# Patient Record
Sex: FEMALE | Race: WHITE | Employment: FULL TIME | URBAN - METROPOLITAN AREA
[De-identification: names, ages, dates, MRNs, and addresses within clinical notes are randomized per-mention and may not be internally consistent; named-entity substitution may affect disease eponyms.]

---

## 2017-10-27 ENCOUNTER — HOSPITAL ENCOUNTER (OUTPATIENT)
Dept: MAMMOGRAPHY | Age: 47
Discharge: HOME | End: 2017-10-27
Attending: OBSTETRICS & GYNECOLOGY | Admitting: OBSTETRICS & GYNECOLOGY

## 2017-10-27 DIAGNOSIS — Z12.31 VISIT FOR SCREENING MAMMOGRAM: ICD-10-CM

## 2018-10-29 ENCOUNTER — HOSPITAL ENCOUNTER (OUTPATIENT)
Dept: MAMMOGRAPHY | Age: 48
Discharge: HOME | End: 2018-11-03
Payer: COMMERCIAL

## 2018-10-29 DIAGNOSIS — Z12.31 VISIT FOR SCREENING MAMMOGRAM: ICD-10-CM

## 2018-10-29 PROCEDURE — 77067 SCR MAMMO BI INCL CAD: CPT

## 2019-11-15 ENCOUNTER — HOSPITAL ENCOUNTER (OUTPATIENT)
Dept: MAMMOGRAPHY | Age: 49
Discharge: HOME | End: 2019-11-20
Payer: COMMERCIAL

## 2019-11-15 DIAGNOSIS — Z12.31 VISIT FOR SCREENING MAMMOGRAM: ICD-10-CM

## 2019-11-15 PROCEDURE — 77063 BREAST TOMOSYNTHESIS BI: CPT

## 2020-10-30 ENCOUNTER — HOSPITAL ENCOUNTER (OUTPATIENT)
Dept: MAMMOGRAPHY | Age: 50
Discharge: HOME | End: 2020-11-04
Payer: COMMERCIAL

## 2020-10-30 PROCEDURE — 77063 BREAST TOMOSYNTHESIS BI: CPT

## 2020-11-17 ENCOUNTER — TELEPHONE (OUTPATIENT)
Dept: SURGERY | Age: 50
End: 2020-11-17

## 2020-11-17 NOTE — TELEPHONE ENCOUNTER
Attempted to call patient to discuss new patient paperwork, unable to reach patient. Left voicemail requesting call back to the office.

## 2020-11-18 NOTE — PROGRESS NOTES
11/19/20     CHIEF COMPLAINT:  Evaluation of high risk status and changes in right breast.     HISTORY OF PRESENT ILLNESS:  Quentin Fierro is a 53 y.o. woman referred by No ref. provider found for evaluation of her risk of breast cancer. The patient's family history is significant for breast cancer in her sister (dx 52) and breast cancer in a maternal great aunt (dx probably 79). Her sister did have BRCA gene testing which she reports was negative. She herself hasn't had any testing. No family history of ovarian cancer, prostate cancer or melanoma. She presents today to discuss her risk for breast cancer, and also some changes in her right breast. She is concerned because her sister palpated her breast mass when she was diagnosed with cancer. About 3 months ago in the shower she noticed that an area of breast tissue along her right inframammary fold felt \"different\" than it had previously. There does not seem to be a clear mass however the tissue itself just feels different, more full. There is no pain in the area. No skin changes over the area. She mentioned this when she went for her most recent screening mammogram and additional imaging was recommended given her symptoms. She notes that she has gained about 10 pounds since her hysterectomy last year, and does feel like her bra strap is now a little tighter. The patient states that she herself has not noticed any abnormal masses in her left breast.   She denies any change in the appearance of her breasts or the skin of her breasts. She denies bilateral nipple discharge. She has no other systemic complaints and otherwise feels well today. She has not had any prior breast biopsies. Her last screening mammogram was performed on 10/30/2020. No suspicious findings were seen in either breast, however due to complaints of right breast pain, additional right breast imaging is recommended before final recommendations are made.   She has never had a breast MRI. She is postmenopausal which she reports was confirmed on labs. I have reviewed her medical records. She previously had elevated liver enzymes and follows with Dr. Jorge Rodas; reports that her labs are better now that she has decreased drinking. 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 She takes no medications. PCP - John Paul, APRN-CNP [facility])  [address] [facility]     REVIEW OF SYSTEMS  Constitutional: Negative for chills, fatigue, fever and unexpected weight change. Eyes: Negative for visual disturbance. Respiratory: Negative for apnea, cough, shortness of breath and wheezing. Cardiovascular: Negative for chest pain, palpitations and leg swelling. Gastrointestinal: Negative for abdominal distention, abdominal pain, nausea and vomiting. Musculoskeletal: Negative for arthralgias, back pain, gait problem and neck pain. Skin: Negative for rash and wound. Neurological: Negative for syncope, weakness, numbness and headaches. Hematological: Negative for adenopathy. Does not bruise/bleed easily. Psychiatric/Behavioral: Negative for confusion and dysphoric mood. The patient is not nervous/anxious. I personally reviewed and agree with the above ROS as documented by my medical assistant. Obstetric and Gynecologic History  Number of pregnancies: 3  Births: 4  (twins)  Age at First Birth:  29  Age at Menstruation:  16  Still Menstruating? No, age of menopause 53  Hysterectomy? Yes age 53 for fibroids, severe bleeding - ovaries still present    Age at first mammogram: 37  Frequency of mammograms: yeaarly  Bra/Cup size: 39 C    History of breastfeeding? Yes   History of OCP Use? Yes for 15 years and is not currently taking  History of hormone use? Never. Past Medical History  History reviewed. No pertinent past medical history. Past Surgical History  History reviewed. No pertinent surgical history. Social History  Patient  reports that she has never smoked. She has never used smokeless tobacco. She reports current alcohol use. She works as a banker. Family History  Family History   Problem Relation Age of Onset    Breast Cancer Sister     Breast Cancer Maternal Aunt      Ashkenazi Quaker descent? No     Current Medications  No current outpatient medications on file. No current facility-administered medications for this visit. Allergies  Allergies not on file    PHYSICAL EXAMINATION:   VS: /81 (Site: Right Upper Arm, Position: Sitting, Cuff Size: Medium Adult)   Pulse 54   Resp 16   Ht 5' 4\" (1.626 m)   Wt 132 lb (59.9 kg)   SpO2 98%   BMI 22.66 kg/m²     General: Well-developed, well-nourished, in no apparent distress. Head: The head is normocephalic  Eyes:  Conjunctivae appear normal. Pupils are equal and reactive. Extraocular movements are intact. Neck: Supple with no thyromegaly or adenopathy  Respiratory: Normal respiratory effort, chest expands symmetrically, lungs are clear to auscultation bilaterally  Cardiovascular: Regular rate and rhythm. No lower extremity edema. Abdomen: Soft, non-distended, without obvious masses   Psychiatric: Alert and oriented x 3, appropriate affect and behavior   Musculoskeletal: Normal gait and range of motion in all 4 extremities. Skin: Warm and dry  Lymphatics: The supraclavicular, submental, cervical and axillary regions are free of significant lymphadenopathy  Right Breast: Examined with patient seated and supine. The skin, nipple, and areola appear normal, there is no skin dimpling with movement of the pectoralis, there is no nipple retraction, no nipple discharge can be elicited, the parenchyma is mildly nodular, there are no dominant masses and the axillary tail is normal. Subtle fullness along right IM fold, 6:00 and extending medially, likely compatible with dense breast tissue.    Left Breast: Examined with patient seated and supine. The skin, nipple, and areola appear normal, there is no skin dimpling with movement of the pectoralis, there is no nipple retraction, no nipple discharge can be elicited, the parenchyma is mildly nodular, there are no dominant masses and the axillary tail is normal.     --------------------------------------    IMAGING: Imaging was performed here and read by our radiologists. I personally reviewed the breast imaging performed on 10/30/2020. Mammogram revealed no suspicious findings bilaterally however imaging incomplete due to her report of right breast symptoms, BI-RADS 0. Breast density is described as average/scattered fibroglandular. I also personally reviewed the imaging from today. Targeted ultrasound was performed in the region of her palpable abnormality in the inferior outer quadrant of the right breast.  No abnormality was visualized. BI-RADS 1. PATHOLOGY:  There is no pathology to review at this time. --------------------------------------    IMPRESSION/RECOMMENDATION:      Otis Singh is a 53 y.o. woman who presents today due to changes in her right breast. There is an area of fullness at 6:00 along the IM fold. I recommend that we proceed with ultrasound to rule out any clear mass. US was able to be performed today and was negative. Additionally, due to her sister's diagnosis of breast cancer at age 52, I did perform a formal risk assessment. Based on the FANTA Risk Assessment tool (Tyrer-Cuzick Model), version 8. her lifetime risk for breast cancer is 12.9% and baed on the [address] her lifetime riskis 17.5%. Since she is <20% lifetime risk, she does not meet high risk criteria at this time. She should continue with her annual screening mammograms once a year. I encouraged her to continue her self examinations on a monthly basis and to alert me of any changes.        We did discuss general risk reduction strategies which include following general healthy life-style choices: maintenance of ideal body weight and body mass index (BMI), limiting alcohol intake (she does drink a few drinks/week), regular exercise several times a week for at least 30 minutes, and smoking cessation (she does not smoke). We also stressed the importance of breast awareness and monthly self breast examination. We also discussed the potential risk of a hereditary cancer syndrome based on her family history, and the role of genetic counseling and testing. With her sister being diagnosed at a young age, testing could potentially be covered by insurance. Her sister was tested and was negative for BRCA. At this time, she would like to hold off on any testing. She will let me know if she changes her mind, or if there are any new cancer diagnoses in the family. I answered all of her questions thoroughly, and she does seem pleased with this plan of approach. I encouraged her to contact me in the interim if any new questions or concerns arise. IN SUMMARY:  - Resume annual screening mammograms  - Follow up PRN, call with any breast concerns or if you decide you would like to meet with genetics     Jaye White. Ivis Ivy, DO  Breast Surgical Oncology    [facility]  Phone: [phone] (option 3)    She did have to leave for a meeting before her US was officially read. I called her at 1:30 PM and she did not answer. I left her a VM stating that the US was normal, and she can call if she has any questions.

## 2020-11-18 NOTE — TELEPHONE ENCOUNTER
Call placed to this patient, unable to reach patient. Left message with request to call this office back to review paperwork prior to upcoming appointment.

## 2020-11-19 ENCOUNTER — INITIAL CONSULT (OUTPATIENT)
Dept: SURGERY | Age: 50
End: 2020-11-19
Payer: COMMERCIAL

## 2020-11-19 ENCOUNTER — HOSPITAL ENCOUNTER (OUTPATIENT)
Dept: WOMENS IMAGING | Age: 50
End: 2020-11-19
Payer: COMMERCIAL

## 2020-11-19 ENCOUNTER — HOSPITAL ENCOUNTER (OUTPATIENT)
Dept: WOMENS IMAGING | Age: 50
Discharge: HOME | End: 2020-11-19
Payer: COMMERCIAL

## 2020-11-19 VITALS
SYSTOLIC BLOOD PRESSURE: 120 MMHG | RESPIRATION RATE: 16 BRPM | BODY MASS INDEX: 22.53 KG/M2 | HEIGHT: 64 IN | OXYGEN SATURATION: 98 % | HEART RATE: 54 BPM | DIASTOLIC BLOOD PRESSURE: 81 MMHG | WEIGHT: 132 LBS

## 2020-11-19 PROCEDURE — 76642 ULTRASOUND BREAST LIMITED: CPT

## 2020-11-19 PROCEDURE — 99204 OFFICE O/P NEW MOD 45 MIN: CPT | Performed by: SURGERY

## 2020-11-19 ASSESSMENT — ENCOUNTER SYMPTOMS
COUGH: 0
BACK PAIN: 0
SHORTNESS OF BREATH: 0
WHEEZING: 0
VOMITING: 0
ABDOMINAL DISTENTION: 0
NAUSEA: 0
ABDOMINAL PAIN: 0
APNEA: 0

## 2020-11-19 NOTE — LETTER
[facility]  [place] [address]  Phone: [phone]  Fax: [phone]    Raysa Mcgovern DO        November 19, 2020     Mitra Gails, APRN - CNP  [address] [id number]    Patient: Roxy Becerra  MR Number: <[id number]>  YOB: 1970  Date of Visit: 11/19/2020    Dear Ms. Madeline Groves:    I saw Roxy Becerra today for the evaluation of changes in the right breast. Below are the relevant portions of my assessment and plan of care. If you have questions, please do not hesitate to call me. I look forward to following Jodie Cushing along with you.     Sincerely,        Raysa Mcgovern DO

## 2020-11-19 NOTE — PROGRESS NOTES
Review of Systems   Constitutional: Negative for chills, fatigue, fever and unexpected weight change. Eyes: Negative for visual disturbance. Respiratory: Negative for apnea, cough, shortness of breath and wheezing. Cardiovascular: Negative for chest pain, palpitations and leg swelling. Gastrointestinal: Negative for abdominal distention, abdominal pain, nausea and vomiting. Musculoskeletal: Negative for arthralgias, back pain, gait problem and neck pain. Skin: Negative for rash and wound. Neurological: Negative for syncope, weakness, numbness and headaches. Hematological: Negative for adenopathy. Does not bruise/bleed easily. Psychiatric/Behavioral: Negative for confusion and dysphoric mood. The patient is not nervous/anxious.

## 2020-11-19 NOTE — PATIENT INSTRUCTIONS
Continue with your routine screening mammograms once a year. Continue to get 3D mammograms. I recommend that you perform self breast exams once a month. Call the office with any concerns. If you ever decide that you would like to consider genetic testing, let me know and we can refer you to genetics. Patient Education        Breast Self-Exam: Care Instructions  Your Care Instructions     A breast self-exam is when you check your breasts for lumps or changes. This regular exam helps you learn how your breasts normally look and feel. Most breast problems or changes are not because of cancer. Breast self-exam is not a substitute for a mammogram. Having regular breast exams by your doctor and regular mammograms improve your chances of finding any problems with your breasts. Some women set a time each month to do a step-by-step breast self-exam. Other women like a less formal system. They might look at their breasts as they brush their teeth, or feel their breasts once in a while in the shower. If you notice a change in your breast, tell your doctor. Follow-up care is a key part of your treatment and safety. Be sure to make and go to all appointments, and call your doctor if you are having problems. It's also a good idea to know your test results and keep a list of the medicines you take. How do you do a breast self-exam?  · The best time to examine your breasts is usually one week after your menstrual period begins. Your breasts should not be tender then. If you do not have periods, you might do your exam on a day of the month that is easy to remember. · To examine your breasts:  ? Remove all your clothes above the waist and lie down. When you are lying down, your breast tissue spreads evenly over your chest wall, which makes it easier to feel all your breast tissue. ?  Use the pads--not the fingertips--of the 3 middle fingers of your left hand to check your right breast. Move your fingers slowly in small coin-sized circles that overlap. ? Use three levels of pressure to feel of all your breast tissue. Use light pressure to feel the tissue close to the skin surface. Use medium pressure to feel a little deeper. Use firm pressure to feel your tissue close to your breastbone and ribs. Use each pressure level to feel your breast tissue before moving on to the next spot. ? Check your entire breast, moving up and down as if following a strip from the collarbone to the bra line, and from the armpit to the ribs. Repeat until you have covered the entire breast.  ? Repeat this procedure for your left breast, using the pads of the 3 middle fingers of your right hand. · To examine your breasts while in the shower:  ? Place one arm over your head and lightly soap your breast on that side. ? Using the pads of your fingers, gently move your hand over your breast (in the strip pattern described above), feeling carefully for any lumps or changes. ? Repeat for the other breast.  · Have your doctor inspect anything you notice to see if you need further testing. Where can you learn more? Go to [email or web address] org and sign in to your [employer] account. Enter P148 in the [facility] box to learn more about \"Breast Self-Exam: Care Instructions. \"     If you do not have an account, please click on the \"Sign Up Now\" link. Current as of: April 29, 2020               Content Version: 12.6  © 7141-8333 [employer]. Care instructions adapted under license by [facility] ([facility]). If you have questions about a medical condition or this instruction, always ask your healthcare professional. [place] [address] any warranty or liability for your use of this information.

## 2020-11-19 NOTE — PROGRESS NOTES
Obstetric/Gynecologic History  Number of pregnancies: 3  Births: 4  Age at First Birth:  29  Age at Menstruation:  16  Still Menstruating? No, age of menopause 51  Hysterectomy? Yes - ovaries still present    Age at first mammogram: 41  Frequency of mammograms: yeaarly  Bra/Cup size: 39 C    History of breastfeeding? Yes   History of OCP Use? Yes for 15 years and is not currently taking  History of hormone use? Never.

## 2021-07-04 ENCOUNTER — APPOINTMENT (OUTPATIENT)
Dept: GENERAL RADIOLOGY | Age: 51
End: 2021-07-04
Payer: COMMERCIAL

## 2021-07-04 ENCOUNTER — HOSPITAL ENCOUNTER (EMERGENCY)
Age: 51
Discharge: HOME | End: 2021-07-05
Payer: COMMERCIAL

## 2021-07-04 DIAGNOSIS — R07.81 RIB PAIN ON LEFT SIDE: Primary | ICD-10-CM

## 2021-07-04 DIAGNOSIS — S20.212A RIB CONTUSION, LEFT, INITIAL ENCOUNTER: ICD-10-CM

## 2021-07-04 PROCEDURE — 71101 X-RAY EXAM UNILAT RIBS/CHEST: CPT

## 2021-07-04 PROCEDURE — 99283 EMERGENCY DEPT VISIT LOW MDM: CPT

## 2021-07-04 ASSESSMENT — PAIN SCALES - GENERAL: PAINLEVEL_OUTOF10: 7

## 2021-07-05 VITALS
HEART RATE: 60 BPM | HEIGHT: 64 IN | TEMPERATURE: 98.1 F | WEIGHT: 128 LBS | DIASTOLIC BLOOD PRESSURE: 77 MMHG | OXYGEN SATURATION: 97 % | SYSTOLIC BLOOD PRESSURE: 115 MMHG | BODY MASS INDEX: 21.85 KG/M2 | RESPIRATION RATE: 16 BRPM

## 2021-07-05 PROCEDURE — 6370000000 HC RX 637 (ALT 250 FOR IP): Performed by: PHYSICIAN ASSISTANT

## 2021-07-05 RX ORDER — LIDOCAINE 4 G/G
1 PATCH TOPICAL ONCE
Status: DISCONTINUED | OUTPATIENT
Start: 2021-07-05 | End: 2021-07-05 | Stop reason: HOSPADM

## 2021-07-05 RX ORDER — LIDOCAINE 4 G/G
1 PATCH TOPICAL DAILY
Qty: 30 PATCH | Refills: 0 | Status: SHIPPED | OUTPATIENT
Start: 2021-07-05 | End: 2021-08-04

## 2021-07-05 RX ORDER — LIDOCAINE 4 G/G
1 PATCH TOPICAL DAILY
Status: DISCONTINUED | OUTPATIENT
Start: 2021-07-05 | End: 2021-07-05

## 2021-07-05 NOTE — ED PROVIDER NOTES
tobacco: Never Used   Vaping Use    Vaping Use: Never used   Substance and Sexual Activity    Alcohol use: Yes     Comment: Occasionally    Drug use: Not Currently    Sexual activity: Not on file   Other Topics Concern    Not on file   Social History Narrative    Not on file     Social Determinants of Health     Financial Resource Strain:     Difficulty of Paying Living Expenses:    Food Insecurity:     Worried About Running Out of Food in the Last Year:     [address] in the Last Year:    Transportation Needs:     Lack of Transportation (Medical):  Lack of Transportation (Non-Medical):    Physical Activity:     Days of Exercise per Week:     Minutes of Exercise per Session:    Stress:     Feeling of Stress :    Social Connections:     Frequency of Communication with Friends and Family:     Frequency of Social Gatherings with Friends and Family:     Attends Shinto Services:     Active Member of Clubs or Organizations:     Attends Club or Organization Meetings:     Marital Status:    Intimate Partner Violence:     Fear of Current or Ex-Partner:     Emotionally Abused:     Physically Abused:     Sexually Abused:      Current Facility-Administered Medications   Medication Dose Route Frequency Provider Last Rate Last Admin    lidocaine 4 % external patch 1 patch  1 patch Transdermal Once Marce Johnson PA-C   1 patch at 07/05/21 0100     Current Outpatient Medications   Medication Sig Dispense Refill    lidocaine 4 % external patch Place 1 patch onto the skin daily 30 patch 0     No Known Allergies    REVIEW OF SYSTEMS  7 systems reviewed, pertinent positives per HPI otherwise noted to be negative    PHYSICAL EXAM  /77   Pulse 60   Temp 98.1 °F (36.7 °C) (Oral)   Resp 16   Ht 5' 4\" (1.626 m)   Wt 128 lb (58.1 kg)   SpO2 97%   BMI 21.97 kg/m²   GENERAL APPEARANCE: Awake and alert. Cooperative. HEAD: Normocephalic. Atraumatic.   EYES: PERRL.   ENT: Mucous membranes are moist.   NECK: Supple. HEART: RRR. No murmurs. LUNGS: Respirations unlabored. CTAB. Good air exchange. Speaking comfortably in full sentences. Both lung fields are clear with good air movement. EXTREMITIES: The palpation of the left anterior thoracic wall at the last 2 ribs. No step-off appreciated. No peripheral edema. Moves all extremities equally. SKIN: Warm and dry. No acute rashes. No contusions appreciated. NEUROLOGICAL: Alert and oriented. No gross facial drooping. PSYCHIATRIC: Normal mood and affect. RADIOLOGY  XR RIBS LEFT INCLUDE CHEST (MIN 3 VIEWS)   Final Result   Mild discoid atelectasis or scarring scattered along the left lung base   laterally with no acute bony abnormality. LABS  Labs Reviewed - No data to display    PROCEDURES  Unless otherwise noted below, none  Procedures    ED COURSE  . CRITICAL CARE TIME   Minutes of critical care time spent not including separately billable procedures. MDM  46-year-old female was emergency department evaluation of a left anterior rib injury that occurred last Tuesday when a child jumped on her chest.  Since then she has had pain with deep inspiration has not limited. On arrival to the emergency department patient's vitals within normal limits she is afebrile nontachycardic ox saturation 99% on room air. Patient patient is resting comfortably on physical exam she has tenderness over the palpation of the last 2 ribs in the anterior thoracic wall however no step-off or contusions are appreciated. Bilateral air movement throughout with low suspicion for pneumothorax at this time. X-ray imaging was obtained which demonstrated mild discoid atelectasis or scarring scattered on left lung base laterally with no acute bony abnormalities identified.   Discussed with the patient that even though there is no acute bony abnormalities appreciated exam there is a likelihood of a unappreciated rib fracture given the sensitivity of x-ray imaging here. However given patient's age and lack of comorbidities I discussed that advanced imaging such as CT will unlikely provide any alterations in our medical disposition. Patient the concerns with her fracture such as development pneumonia. Patient was given some incentive spirometry in the emergency room discharged home with the device. Recommend she use every 4 hours to promote expansion of her lungs. Patient states she has adequate pain control Tylenol ibuprofen and Tylenol. Will apply lidocaine patch and discharge patient home with a prescription for some. Patient given good return precautions discharged home in stable condition. Patient that since her symptoms began upon the injury not upon returning home low suspicion for etiology such as pulmonary embolism at this time. Patient has no secondary factors for pulmonary embolism and has no appreciation of lower extremity edema on exam.  Her vitals are all within normal limits. This with the patient that she should return if her symptoms worsen or she develop any chest pain for reinitiation of marijuana immediately for imaging at that time however and comfortable with discharging home to follow-up with her primary care provider. DISPOSITION  Patient was discharged to home in good condition. CLINICAL IMPRESSION  1.  Rib pain on left side    2. Rib contusion, left, initial encounter           Ron Mcwilliams PA-C  07/05/21 0105

## 2021-07-05 NOTE — ED NOTES
Taught pt how to use an IS. Pt verbalized understanding of use. All discharge paperwork and follow-up instructions reviewed with pt. Pt verbalized understanding. Pt ambulatory upon discharge in stable condition to private vehicle.        Saima Aceves RN  07/05/21 [id number]

## 2022-02-11 ENCOUNTER — HOSPITAL ENCOUNTER (OUTPATIENT)
Dept: MAMMOGRAPHY | Age: 52
Discharge: HOME | End: 2022-02-11
Payer: COMMERCIAL

## 2022-02-11 VITALS — HEIGHT: 64 IN | BODY MASS INDEX: 21.85 KG/M2 | WEIGHT: 128 LBS

## 2022-02-11 DIAGNOSIS — Z12.31 VISIT FOR SCREENING MAMMOGRAM: ICD-10-CM

## 2022-02-11 PROCEDURE — 77063 BREAST TOMOSYNTHESIS BI: CPT

## 2022-05-16 ENCOUNTER — TELEPHONE (OUTPATIENT)
Dept: SURGERY | Age: 52
End: 2022-05-16

## 2022-05-16 NOTE — TELEPHONE ENCOUNTER
Spoke to patient and attempted to confirm appointment for Thursday 5/19/22 and patient requested that her appointment be changed.  Appointment rescheduled for 6/16/22 at 8:30 am.

## 2022-06-16 ENCOUNTER — OFFICE VISIT (OUTPATIENT)
Dept: SURGERY | Age: 52
End: 2022-06-16
Payer: COMMERCIAL

## 2022-06-16 VITALS
HEIGHT: 64 IN | DIASTOLIC BLOOD PRESSURE: 68 MMHG | HEART RATE: 54 BPM | SYSTOLIC BLOOD PRESSURE: 122 MMHG | BODY MASS INDEX: 22.4 KG/M2 | RESPIRATION RATE: 18 BRPM | OXYGEN SATURATION: 98 % | WEIGHT: 131.2 LBS

## 2022-06-16 DIAGNOSIS — Z80.3 FAMILY HISTORY OF BREAST CANCER IN SISTER: Primary | ICD-10-CM

## 2022-06-16 DIAGNOSIS — Z12.39 ENCOUNTER FOR SCREENING BREAST EXAMINATION: Primary | ICD-10-CM

## 2022-06-16 DIAGNOSIS — Z80.3 FAMILY HISTORY OF BREAST CANCER IN SISTER: ICD-10-CM

## 2022-06-16 DIAGNOSIS — Z12.31 ENCOUNTER FOR SCREENING MAMMOGRAM FOR BREAST CANCER: ICD-10-CM

## 2022-06-16 PROCEDURE — 99213 OFFICE O/P EST LOW 20 MIN: CPT | Performed by: NURSE PRACTITIONER

## 2022-06-16 RX ORDER — ATORVASTATIN CALCIUM 10 MG/1
TABLET, FILM COATED ORAL
COMMUNITY
Start: 2022-05-23

## 2022-06-16 NOTE — PROGRESS NOTES
[facility]   Surgical Breast Oncology        CC: One year [id number] for one-year breast check and mammogram     HPI: Bao Valladares is a 47 y.o. woman here for annual follow up for breast check secondary to family history of breast cancer. She states that she does perform routine self breast evaluations and has not noticed any new abnormalities such as masses, skin changes, color changes, nipple discharge, or changes to the nipple-areolar complex. No personal history of cancer. Her family cancer history is significant for breast cancer in her sister, diagnosed at age 52, negative for BRCA and maternal great aunt. She has not a breast biopsy in the past.      Lifetime risk of breast cancer calculated by Tyrer-Cuzick (FANTA) 8.0 Model -  12.9%. Mammogram 2/11/2022 reveals no new or concerning findings suggestive of malignancy. BI-RADS 1. History reviewed. No pertinent past medical history. Past Surgical History:   Procedure Laterality Date    HYSTERECTOMY (CERVIX STATUS UNKNOWN)         Obstetric and Gynecologic History  Number of pregnancies: 3  Births: 4  (twins)  Age at First Birth:  29  Age at Menstruation:  16  Still Menstruating? No, age of menopause 53  Hysterectomy?  Yes age 53 for fibroids, severe bleeding - ovaries still present     Age at first mammogram: 37  Frequency of mammograms: yeaarly  Bra/Cup size: 39 C    Family History   Problem Relation Age of Onset    Breast Cancer Sister 37    Breast Cancer Maternal Aunt        Allergies as of 06/16/2022    (No Known Allergies)       Social History     Tobacco Use    Smoking status: Never Smoker    Smokeless tobacco: Never Used   Vaping Use    Vaping Use: Never used   Substance Use Topics    Alcohol use: Yes     Comment: Occasionally    Drug use: Not Currently       Current Outpatient Medications on File Prior to Visit   Medication Sig Dispense Refill    atorvastatin (LIPITOR) 10 MG tablet        No current facility-administered medications on file prior to visit. Medications: documentation has been reviewed in the electronic medical record and patient office intake form. REVIEW OF SYSTEMS:  Constitutional: Negative for fever  HENT: Negative for sore throat  Eyes: Negative for redness   Respiratory: Negative for dyspnea, cough  Cardiovascular: Negative for chest pain  Gastrointestinal: Negative for vomiting, diarrhea   Genitourinary: Negative for hematuria   Musculoskeletal: Negative for arthralgias   Skin: Negative for rash  Neurological: Negative for syncope  Hematological: Negative for adenopathy  Psychiatric/Behavorial: Negative for anxiety     PHYSICAL EXAM:  /68   Pulse 54   Resp 18   Ht 5' 4\" (1.626 m)   Wt 131 lb 3.2 oz (59.5 kg)   SpO2 98%   BMI 22.52 kg/m²   Constitutional: She appearswell-nourished. No apparent distress. Breast: The patient was examined in the upright and supine position. Breasts are symmetrically ptotic. Right: No new masses or changes in breast contour. No skin changes of the breast or nipple areolar complex. No nipple inversion or discharge. No erythema, thickening (peau d'orange), or dimpling. Left: No new masses or changes in breast contour. No skin changes of the breast or nipple areolar complex. No nipple inversion or discharge. No erythema, thickening (peau d'orange), or dimpling. There is no axillary lymphadenopathy palpated bilaterally. Head: Normocephalic and atraumatic:   Neck: Neck supple. No tracheal deviation present. No obvious mass. Lymphatics: No palpable supraclavicular, cervical, or axillary lymphadenopathy  Skin: No rash noted. No erythema. Neurologic: alert and oriented. Extremities: appear well perfused. Risk assessment using FANTA Breast Cancer Risk Evaluation Tool to evaluate her risk compared to the general population. Her lifetime risk for breast cancer is 12.9% (general population average 12%). ASSESSMENT:  - Screening Breast Examination - stable breast examination and stable bilateral screening mammogram 2/2022. - Family History of Breast Cancer - sister, dx 52, maternal great aunt        PLAN:    Continue annual screening mammography with tomosynthesis. Due 2/2023   Continue annual clinical breast exams    Discussed the importance of breast awareness including the importance and technique of self breast exams   Most recent breast imaging was reviewed, documented above, results were discussed with the patient, all questions answered   Education provided for Healthy Lifestyle Recommendations: healthy diet, routine exercise, weight control, decreased alcohol consumption.  Follow up after annual mammogram in 1 year            JENNIFER Shepard-CNP  [facility])   Surgical Breast Oncology   [phone]     All of the patient's questions were answered at this time however, she was encouraged to call the office with any further inquiries. Approximately 25 minutes of time were spent in preparation, direct patient contact, counseling, care coordination, documentation and activities otherwise related to this encounter.

## 2023-02-16 ENCOUNTER — HOSPITAL ENCOUNTER (OUTPATIENT)
Dept: MAMMOGRAPHY | Age: 53
Discharge: HOME | End: 2023-02-16
Payer: COMMERCIAL

## 2023-02-16 ENCOUNTER — OFFICE VISIT (OUTPATIENT)
Dept: SURGERY | Age: 53
End: 2023-02-16

## 2023-02-16 VITALS
OXYGEN SATURATION: 98 % | TEMPERATURE: 97.2 F | HEIGHT: 64 IN | RESPIRATION RATE: 18 BRPM | WEIGHT: 132.2 LBS | HEART RATE: 51 BPM | BODY MASS INDEX: 22.57 KG/M2

## 2023-02-16 VITALS — WEIGHT: 131 LBS | HEIGHT: 64 IN | BODY MASS INDEX: 22.36 KG/M2

## 2023-02-16 DIAGNOSIS — Z12.31 ENCOUNTER FOR SCREENING MAMMOGRAM FOR BREAST CANCER: ICD-10-CM

## 2023-02-16 DIAGNOSIS — Z80.3 FAMILY HISTORY OF BREAST CANCER IN SISTER: ICD-10-CM

## 2023-02-16 DIAGNOSIS — Z12.39 ENCOUNTER FOR SCREENING BREAST EXAMINATION: Primary | ICD-10-CM

## 2023-02-16 DIAGNOSIS — Z12.31 ENCOUNTER FOR SCREENING MAMMOGRAM FOR BREAST CANCER: Primary | ICD-10-CM

## 2023-02-16 PROCEDURE — 77063 BREAST TOMOSYNTHESIS BI: CPT

## 2023-02-16 NOTE — PATIENT INSTRUCTIONS
Healthy Lifestyle Recommendations: healthy diet (decrease consumption of red meat, increase fresh fruits and vegetables), decreased alcohol consumption (less than 4 drinks/week), adequate sleep (goal 6-8 hours), routine exercise (goal 150 minutes/week or greater), weight control. Patient Education        Breast Self-Exam: Care Instructions  Your Care Instructions     A breast self-exam is when you check your breasts for lumps or changes. This regular exam helps you learn how your breasts normally look and feel. Most breast problems or changes are not because of cancer. Breast self-exam is not a substitute for a mammogram. Having regular breast exams by your doctor and regular mammograms improve your chances of finding any problems with your breasts. Some women set a time each month to do a step-by-step breast self-exam. Other women like a less formal system. They might look at their breasts as they brush their teeth, or feel their breasts once in a while in the shower. If you notice a change in your breast, tell your doctor. Follow-up care is a key part of your treatment and safety. Be sure to make and go to all appointments, and call your doctor if you are having problems. It's also a good idea to know your test results and keep a list of the medicines you take. How do you do a breast self-exam?  The best time to examine your breasts is usually one week after your menstrual period begins. Your breasts should not be tender then. If you do not have periods, you might do your exam on a day of the month that is easy to remember. To examine your breasts:  Remove all your clothes above the waist and lie down. When you are lying down, your breast tissue spreads evenly over your chest wall, which makes it easier to feel all your breast tissue. Use the pads--not the fingertips--of the 3 middle fingers of your left hand to check your right breast. Move your fingers slowly in small coin-sized circles that overlap.   Use three levels of pressure to feel of all your breast tissue. Use light pressure to feel the tissue close to the skin surface. Use medium pressure to feel a little deeper. Use firm pressure to feel your tissue close to your breastbone and ribs. Use each pressure level to feel your breast tissue before moving on to the next spot. Check your entire breast, moving up and down as if following a strip from the collarbone to the bra line, and from the armpit to the ribs. Repeat until you have covered the entire breast.  Repeat this procedure for your left breast, using the pads of the 3 middle fingers of your right hand. To examine your breasts while in the shower:  Place one arm over your head and lightly soap your breast on that side. Using the pads of your fingers, gently move your hand over your breast (in the strip pattern described above), feeling carefully for any lumps or changes. Repeat for the other breast.  Have your doctor inspect anything you notice to see if you need further testing. Where can you learn more? Go to http://www.woods.com/ and enter P148 to learn more about \"Breast Self-Exam: Care Instructions. \"  Current as of: August 2, 2022               Content Version: 13.5  © 2006-2022 [employer]. Care instructions adapted under license by [facility] ([facility]). If you have questions about a medical condition or this instruction, always ask your healthcare professional. [place] [address] any warranty or liability for your use of this information.

## 2023-02-16 NOTE — PROGRESS NOTES
[facility]   Surgical Breast Oncology        CC: One year [id number] for one-year breast check and mammogram     HPI: Neil Hernandes is a 47 y.o. woman here for annual follow up for breast check secondary to family history of breast cancer. She states that she does perform routine self breast evaluations and has not noticed any new abnormalities such as masses, skin changes, color changes, nipple discharge, or changes to the nipple-areolar complex. No personal history of cancer. Her family cancer history is significant for breast cancer in her sister, diagnosed at age 52, negative for BRCA and maternal great aunt. She has not a breast biopsy in the past.      Lifetime risk of breast cancer calculated by Tyrer-Cuzick (FANTA) 8.0 Model -  12.9%. Mammogram 2/16/2023 reveals no new or concerning findings suggestive of malignancy. BI-RADS 1. History reviewed. No pertinent past medical history. Past Surgical History:   Procedure Laterality Date    HYSTERECTOMY (CERVIX STATUS UNKNOWN)  2020       Obstetric and Gynecologic History  Number of pregnancies: 3  Births: 4  (twins)  Age at First Birth:  29  Age at Menstruation:  16  Still Menstruating? No, age of menopause 53  Hysterectomy?  Yes age 53 for fibroids, severe bleeding - ovaries still present     Age at first mammogram: 37  Frequency of mammograms: yeaarly  Bra/Cup size: 39 C    Family History   Problem Relation Age of Onset    Breast Cancer Sister 37    Breast Cancer Maternal Aunt        Allergies as of 02/16/2023    (No Known Allergies)       Social History     Tobacco Use    Smoking status: Never    Smokeless tobacco: Never   Vaping Use    Vaping Use: Never used   Substance Use Topics    Alcohol use: Yes     Comment: Occasionally    Drug use: Not Currently       Current Outpatient Medications on File Prior to Visit   Medication Sig Dispense Refill    atorvastatin (LIPITOR) 10 MG tablet        No current facility-administered medications on file prior to visit. Medications: documentation has been reviewed in the electronic medical record and patient office intake form. REVIEW OF SYSTEMS:  Constitutional: Negative for fever  HENT: Negative for sore throat  Eyes: Negative for redness   Respiratory: Negative for dyspnea, cough  Cardiovascular: Negative for chest pain  Gastrointestinal: Negative for vomiting, diarrhea   Genitourinary: Negative for hematuria   Musculoskeletal: Negative for arthralgias   Skin: Negative for rash  Neurological: Negative for syncope  Hematological: Negative for adenopathy  Psychiatric/Behavorial: Negative for anxiety     PHYSICAL EXAM:  Pulse 51   Temp 97.2 °F (36.2 °C)   Resp 18   Ht 5' 4\" (1.626 m)   Wt 132 lb 3.2 oz (60 kg)   SpO2 98%   BMI 22.69 kg/m²   Constitutional: She appearswell-nourished. No apparent distress. Breast: The patient was examined in the upright and supine position. Breasts are symmetrically ptotic. Right: No new masses or changes in breast contour. No skin changes of the breast or nipple areolar complex. No nipple inversion or discharge. No erythema, thickening (peau d'orange), or dimpling. Left: No new masses or changes in breast contour. No skin changes of the breast or nipple areolar complex. No nipple inversion or discharge. No erythema, thickening (peau d'orange), or dimpling. There is no axillary lymphadenopathy palpated bilaterally. Head: Normocephalic and atraumatic:   Neck: Neck supple. No tracheal deviation present. No obvious mass. Lymphatics: No palpable supraclavicular, cervical, or axillary lymphadenopathy  Skin: No rash noted. No erythema. Neurologic: alert and oriented. Extremities: appear well perfused. Risk assessment using FANTA Breast Cancer Risk Evaluation Tool to evaluate her risk compared to the general population. Her lifetime risk for breast cancer is 12.9% (general population average 12%).        ASSESSMENT:  - Screening Breast Examination - stable breast examination and stable bilateral screening mammogram 2/2022. - Family History of Breast Cancer - sister, dx 52, maternal great aunt        PLAN:   Continue annual screening mammography with tomosynthesis. Due 2/2024  Continue annual clinical breast exams   Discussed the importance of breast awareness including the importance and technique of self breast exams  Most recent breast imaging was reviewed, documented above, results were discussed with the patient, all questions answered  Education provided for Healthy Lifestyle Recommendations: healthy diet, routine exercise, weight control, decreased alcohol consumption. Follow up after annual mammogram in 1 year            Mortimer Amen, APRN-CNP  [facility])   Surgical Breast Oncology   [phone]     All of the patient's questions were answered at this time however, she was encouraged to call the office with any further inquiries. Approximately 25 minutes of time were spent in preparation, direct patient contact, counseling, care coordination, documentation and activities otherwise related to this encounter.

## 2023-08-07 ENCOUNTER — TELEPHONE (OUTPATIENT)
Dept: SURGERY | Age: 53
End: 2023-08-07

## 2023-08-07 NOTE — TELEPHONE ENCOUNTER
Called patient to Reschedule/move appointment and imaging from 02/26/24 w/Cher at [facility].   Tolu Ding is out of office  Please reschedule appointment next available

## 2023-09-12 ENCOUNTER — TELEPHONE (OUTPATIENT)
Dept: SURGERY | Age: 53
End: 2023-09-12

## 2023-09-12 NOTE — TELEPHONE ENCOUNTER
Left message on voicemail to move/reschedule appointment with Tony Vitale CNP with imaging- out of office  On 02/26/23  Please reschedule-

## 2024-04-18 ENCOUNTER — HOSPITAL ENCOUNTER (OUTPATIENT)
Dept: MAMMOGRAPHY | Age: 54
Discharge: HOME | End: 2024-04-18
Payer: COMMERCIAL

## 2024-04-18 ENCOUNTER — OFFICE VISIT (OUTPATIENT)
Dept: SURGERY | Age: 54
End: 2024-04-18
Payer: COMMERCIAL

## 2024-04-18 VITALS — WEIGHT: 130 LBS | BODY MASS INDEX: 22.2 KG/M2 | HEIGHT: 64 IN

## 2024-04-18 VITALS
RESPIRATION RATE: 18 BRPM | HEART RATE: 65 BPM | WEIGHT: 131.8 LBS | BODY MASS INDEX: 22.5 KG/M2 | OXYGEN SATURATION: 98 % | HEIGHT: 64 IN

## 2024-04-18 DIAGNOSIS — Z12.31 VISIT FOR SCREENING MAMMOGRAM: ICD-10-CM

## 2024-04-18 DIAGNOSIS — Z80.3 FAMILY HISTORY OF BREAST CANCER IN SISTER: ICD-10-CM

## 2024-04-18 DIAGNOSIS — Z12.31 ENCOUNTER FOR SCREENING MAMMOGRAM FOR BREAST CANCER: ICD-10-CM

## 2024-04-18 DIAGNOSIS — Z12.39 ENCOUNTER FOR SCREENING BREAST EXAMINATION: Primary | ICD-10-CM

## 2024-04-18 PROCEDURE — 77063 BREAST TOMOSYNTHESIS BI: CPT

## 2024-04-18 PROCEDURE — 99213 OFFICE O/P EST LOW 20 MIN: CPT | Performed by: NURSE PRACTITIONER

## 2024-04-18 NOTE — PROGRESS NOTES
medications on file prior to visit.           Medications: documentation has been reviewed in the electronic medical record and patient office intake form.     REVIEW OF SYSTEMS:  Constitutional: Negative for fever  HENT: Negative for sore throat  Eyes: Negative for redness   Respiratory: Negative for dyspnea, cough  Cardiovascular: Negative for chest pain  Gastrointestinal: Negative for vomiting, diarrhea   Genitourinary: Negative for hematuria   Musculoskeletal: Negative for arthralgias   Skin: Negative for rash  Neurological: Negative for syncope  Hematological: Negative for adenopathy  Psychiatric/Behavorial: Negative for anxiety     PHYSICAL EXAM:  Pulse 65   Resp 18   Ht 1.626 m (5' 4\")   Wt 59.8 kg (131 lb 12.8 oz)   SpO2 98%   BMI 22.62 kg/m²   Constitutional: She appearswell-nourished. No apparent distress.  Breast: The patient was examined in the upright and supine position. Breasts are symmetrically ptotic.        Right: No new masses or changes in breast contour.  No skin changes of the breast or nipple areolar complex.  No nipple inversion or discharge. No erythema, thickening (peau d'orange), or dimpling.        Left: No new masses or changes in breast contour.  No skin changes of the breast or nipple areolar complex.  No nipple inversion or discharge. No erythema, thickening (peau d'orange), or dimpling.   There is no axillary lymphadenopathy palpated bilaterally.   Head: Normocephalic and atraumatic:   Neck: Neck supple. No tracheal deviation present. No obvious mass.  Lymphatics: No palpable supraclavicular, cervical, or axillary lymphadenopathy  Skin: No rash noted. No erythema.   Neurologic: alert and oriented.  Extremities: appear well perfused.        Risk assessment using FANTA Breast Cancer Risk Evaluation Tool to evaluate her risk compared to the general population.  Her lifetime risk for breast cancer is 12.9% (general population average 12%).       ASSESSMENT:  - Screening Breast

## 2024-04-22 DIAGNOSIS — Z12.31 ENCOUNTER FOR SCREENING MAMMOGRAM FOR BREAST CANCER: Primary | ICD-10-CM

## 2025-02-13 ENCOUNTER — TELEPHONE (OUTPATIENT)
Dept: SURGERY | Age: 55
End: 2025-02-13

## 2025-02-13 NOTE — TELEPHONE ENCOUNTER
Called patient left message on voice mail to reschedule appointment on 04/24/25  w/ Cher Rodriguez CNP at [facility].  (Cher is out of office)    Appointment has been canceled with Cher Rodriguez CNP at [place] office    Mammogram on 04/24/25 at 12:30 has not been canceled.    Please reschedule to next available.

## 2025-05-09 ENCOUNTER — TELEPHONE (OUTPATIENT)
Dept: SURGERY | Age: 55
End: 2025-05-09

## 2025-05-09 NOTE — TELEPHONE ENCOUNTER
Patient called to cancel appointment for today 05/09/25 due to work. Patient Rescheduled for 10/10/25. If this cancellation is within 24 hours of appointment time, it is counted as same day cancellation.

## 2025-05-15 ENCOUNTER — HOSPITAL ENCOUNTER (OUTPATIENT)
Dept: MAMMOGRAPHY | Age: 55
Discharge: HOME | End: 2025-05-15
Payer: COMMERCIAL

## 2025-05-15 ENCOUNTER — RESULTS FOLLOW-UP (OUTPATIENT)
Dept: SURGERY | Age: 55
End: 2025-05-15

## 2025-05-15 VITALS — HEIGHT: 64 IN | BODY MASS INDEX: 22.36 KG/M2 | WEIGHT: 131 LBS

## 2025-05-15 DIAGNOSIS — Z12.31 ENCOUNTER FOR SCREENING MAMMOGRAM FOR BREAST CANCER: ICD-10-CM

## 2025-05-15 PROCEDURE — 77067 SCR MAMMO BI INCL CAD: CPT
